# Patient Record
Sex: FEMALE | Race: WHITE | URBAN - METROPOLITAN AREA
[De-identification: names, ages, dates, MRNs, and addresses within clinical notes are randomized per-mention and may not be internally consistent; named-entity substitution may affect disease eponyms.]

---

## 2019-05-08 PROBLEM — 27503000 GILBERT'S SYNDROME: Status: ACTIVE | Noted: 2019-05-08

## 2019-05-08 PROBLEM — 35489007 DEPRESSION: Status: ACTIVE | Noted: 2019-05-08

## 2019-05-08 PROBLEM — 197480006 ANXIETY DISORDER: Status: ACTIVE | Noted: 2019-05-08

## 2021-08-28 ENCOUNTER — TELEPHONE ENCOUNTER (OUTPATIENT)
Dept: URBAN - METROPOLITAN AREA CLINIC 13 | Facility: CLINIC | Age: 46
End: 2021-08-28

## 2021-08-29 ENCOUNTER — TELEPHONE ENCOUNTER (OUTPATIENT)
Dept: URBAN - METROPOLITAN AREA CLINIC 13 | Facility: CLINIC | Age: 46
End: 2021-08-29

## 2021-08-29 RX ORDER — LEVOTHYROXINE SODIUM 0.14 MG/1
TABLET ORAL
Status: ACTIVE | COMMUNITY

## 2021-08-29 RX ORDER — MULTIVIT-MIN/FOLIC/VIT K/LYCOP 400-300MCG
TABLET ORAL
Status: ACTIVE | COMMUNITY

## 2021-08-29 RX ORDER — FLUOXETINE HYDROCHLORIDE 20 MG/1
CAPSULE ORAL
Status: ACTIVE | COMMUNITY

## 2021-08-29 RX ORDER — LEVOTHYROXINE SODIUM 125 UG/1
TABLET ORAL
Status: ACTIVE | COMMUNITY

## 2023-05-01 ENCOUNTER — P2P PATIENT RECORD (OUTPATIENT)
Age: 48
End: 2023-05-01

## 2023-05-22 ENCOUNTER — OFFICE VISIT (OUTPATIENT)
Dept: URBAN - METROPOLITAN AREA CLINIC 70 | Facility: CLINIC | Age: 48
End: 2023-05-22

## 2023-10-26 ENCOUNTER — LAB OUTSIDE AN ENCOUNTER (OUTPATIENT)
Dept: URBAN - METROPOLITAN AREA CLINIC 70 | Facility: CLINIC | Age: 48
End: 2023-10-26

## 2023-10-26 ENCOUNTER — DASHBOARD ENCOUNTERS (OUTPATIENT)
Age: 48
End: 2023-10-26

## 2023-10-26 ENCOUNTER — OFFICE VISIT (OUTPATIENT)
Dept: URBAN - METROPOLITAN AREA CLINIC 70 | Facility: CLINIC | Age: 48
End: 2023-10-26
Payer: COMMERCIAL

## 2023-10-26 VITALS
DIASTOLIC BLOOD PRESSURE: 74 MMHG | SYSTOLIC BLOOD PRESSURE: 134 MMHG | TEMPERATURE: 97.7 F | HEIGHT: 61 IN | BODY MASS INDEX: 23.94 KG/M2 | WEIGHT: 126.8 LBS | HEART RATE: 91 BPM

## 2023-10-26 DIAGNOSIS — R10.13 EPIGASTRIC PAIN: ICD-10-CM

## 2023-10-26 DIAGNOSIS — Z12.11 COLON CANCER SCREENING: ICD-10-CM

## 2023-10-26 DIAGNOSIS — R79.89 ELEVATED LFTS: ICD-10-CM

## 2023-10-26 DIAGNOSIS — K52.89 OTHER SPECIFIED NONINFECTIVE GASTROENTERITIS AND COLITIS: ICD-10-CM

## 2023-10-26 PROCEDURE — 99204 OFFICE O/P NEW MOD 45 MIN: CPT | Performed by: NURSE PRACTITIONER

## 2023-10-26 RX ORDER — TRAZODONE HYDROCHLORIDE 150 MG/1
TABLET ORAL
Qty: 90 TABLET | Status: ACTIVE | COMMUNITY

## 2023-10-26 RX ORDER — MULTIVIT-MIN/FOLIC/VIT K/LYCOP 400-300MCG
TABLET ORAL
Status: ON HOLD | COMMUNITY

## 2023-10-26 RX ORDER — OMEPRAZOLE 40 MG/1
1 CAPSULE 30 MINUTES BEFORE MORNING MEAL CAPSULE, DELAYED RELEASE ORAL ONCE A DAY
Qty: 90 | Refills: 3 | OUTPATIENT
Start: 2023-10-26

## 2023-10-26 RX ORDER — FLUOXETINE HYDROCHLORIDE 20 MG/1
CAPSULE ORAL
Status: ON HOLD | COMMUNITY

## 2023-10-26 RX ORDER — LEVOTHYROXINE SODIUM 0.14 MG/1
TABLET ORAL
Status: ON HOLD | COMMUNITY

## 2023-10-26 RX ORDER — TEMAZEPAM 15 MG/1
CAPSULE ORAL
Qty: 60 CAPSULE | Status: ACTIVE | COMMUNITY

## 2023-10-26 RX ORDER — LEVOTHYROXINE SODIUM 175 UG/1
TABLET ORAL
Qty: 45 TABLET | Status: ACTIVE | COMMUNITY

## 2023-10-26 RX ORDER — CYCLOBENZAPRINE HYDROCHLORIDE 10 MG/1
TABLET, FILM COATED ORAL
Qty: 90 TABLET | Status: ACTIVE | COMMUNITY

## 2023-10-26 RX ORDER — ESTRADIOL 0.1 MG/G
INSERT 1 APPLICATORFUL VAGINALLY ONCE A DAY FOR 2 WEEKS, THEN 2 - 3 TIMES PER WEEK AFTERWARDS CREAM VAGINAL
Qty: 42.5 GRAM | Refills: 0 | Status: ACTIVE | COMMUNITY

## 2023-10-26 RX ORDER — LEVOTHYROXINE SODIUM 150 UG/1
TABLET ORAL
Qty: 45 TABLET | Status: ACTIVE | COMMUNITY

## 2023-10-26 RX ORDER — DOXEPIN HYDROCHLORIDE 75 MG/1
CAPSULE ORAL
Qty: 90 CAPSULE | Status: ACTIVE | COMMUNITY

## 2023-10-26 RX ORDER — ESTERIFIED ESTROGENS AND METHYLTESTOSTERONE .625; 1.25 MG/1; MG/1
TABLET ORAL
Qty: 30 TABLET | Status: ACTIVE | COMMUNITY

## 2023-10-26 RX ORDER — LEVOTHYROXINE SODIUM 125 UG/1
TABLET ORAL
Status: ON HOLD | COMMUNITY

## 2023-10-26 NOTE — HPI-TODAY'S VISIT:
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 Santa; currently [place] [facility]) who presents today for abdominal pain. She reports chronic intermittent epigastric pain. Pain is worse after eating and was not improved with taking PPI. No NSAIDs. No prior EGD. With a prior episode of epigastric pain she underwent abdominal u/s and unltimately ended up with having cholecystectomy with symptoms now recurrent. Underwent  abdominal CT 9/27/23 for pain that showed enteritis. She was given flagyl with symptoms again improving but then reoccurring. Most recent labs 10/4/23 showed elevated LFTs with alk phos 150 and ALT 59 but WBC, plt, and H/H WNL. No hx of liver disease. She believes she underwent colonoscopy in 2019 with tx of abscess but is unsure of results. No known Fhx of colon cancer or IBD. Denies wt loss, vomiting, dysphagia, jaundice, constipation, diarrhea, or signs of GI bleeding.

## 2023-11-02 LAB
A/G RATIO: 2
ALBUMIN: 4.6
ALKALINE PHOSPHATASE: 67
ALT (SGPT): 22
AST (SGOT): 27
BILIRUBIN, TOTAL: 1.2
BUN/CREATININE RATIO: (no result)
BUN: 14
C-REACTIVE PROTEIN, QUANT: 1
CALCIUM: 9.6
CARBON DIOXIDE, TOTAL: 26
CHLORIDE: 108
CREATININE: 0.93
EGFR: 75
GLOBULIN, TOTAL: 2.3
GLUCOSE: 85
POTASSIUM: 4.5
PROTEIN, TOTAL: 6.9
SED RATE BY MODIFIED: 2
SODIUM: 142

## 2023-11-06 ENCOUNTER — WEB ENCOUNTER (OUTPATIENT)
Dept: URBAN - METROPOLITAN AREA CLINIC 70 | Facility: CLINIC | Age: 48
End: 2023-11-06

## 2023-11-07 ENCOUNTER — WEB ENCOUNTER (OUTPATIENT)
Dept: URBAN - METROPOLITAN AREA CLINIC 70 | Facility: CLINIC | Age: 48
End: 2023-11-07

## 2023-11-07 LAB — CALPROTECTIN, FECAL: 31

## 2023-11-08 ENCOUNTER — WEB ENCOUNTER (OUTPATIENT)
Dept: URBAN - METROPOLITAN AREA CLINIC 70 | Facility: CLINIC | Age: 48
End: 2023-11-08

## 2023-11-15 ENCOUNTER — CLAIMS CREATED FROM THE CLAIM WINDOW (OUTPATIENT)
Dept: URBAN - METROPOLITAN AREA SURGERY CENTER 24 | Facility: SURGERY CENTER | Age: 48
End: 2023-11-15
Payer: COMMERCIAL

## 2023-11-15 ENCOUNTER — TELEPHONE ENCOUNTER (OUTPATIENT)
Dept: URBAN - METROPOLITAN AREA CLINIC 70 | Facility: CLINIC | Age: 48
End: 2023-11-15

## 2023-11-15 DIAGNOSIS — Z12.11 COLON CANCER SCREENING (HIGH RISK): ICD-10-CM

## 2023-11-15 DIAGNOSIS — R10.13 ABDOMINAL DISCOMFORT, EPIGASTRIC: ICD-10-CM

## 2023-11-15 DIAGNOSIS — Z12.11 COLON CANCER SCREENING: ICD-10-CM

## 2023-11-15 DIAGNOSIS — K57.30 DIVERTICULA, COLON: ICD-10-CM

## 2023-11-15 DIAGNOSIS — R10.13 EPIGASTRIC ABDOMINAL PAIN: ICD-10-CM

## 2023-11-15 PROCEDURE — 43235 EGD DIAGNOSTIC BRUSH WASH: CPT | Performed by: INTERNAL MEDICINE

## 2023-11-15 PROCEDURE — G0121 COLON CA SCRN NOT HI RSK IND: HCPCS | Performed by: INTERNAL MEDICINE

## 2023-11-15 PROCEDURE — G8907 PT DOC NO EVENTS ON DISCHARG: HCPCS | Performed by: INTERNAL MEDICINE

## 2023-11-15 PROCEDURE — 00813 ANES UPR LWR GI NDSC PX: CPT | Performed by: NURSE ANESTHETIST, CERTIFIED REGISTERED

## 2023-11-15 RX ORDER — TRAZODONE HYDROCHLORIDE 150 MG/1
TABLET ORAL
Qty: 90 TABLET | Status: ACTIVE | COMMUNITY

## 2023-11-15 RX ORDER — LEVOTHYROXINE SODIUM 125 UG/1
TABLET ORAL
Status: ON HOLD | COMMUNITY

## 2023-11-15 RX ORDER — DOXEPIN HYDROCHLORIDE 75 MG/1
CAPSULE ORAL
Qty: 90 CAPSULE | Status: ACTIVE | COMMUNITY

## 2023-11-15 RX ORDER — MULTIVIT-MIN/FOLIC/VIT K/LYCOP 400-300MCG
TABLET ORAL
Status: ON HOLD | COMMUNITY

## 2023-11-15 RX ORDER — ESTRADIOL 0.1 MG/G
INSERT 1 APPLICATORFUL VAGINALLY ONCE A DAY FOR 2 WEEKS, THEN 2 - 3 TIMES PER WEEK AFTERWARDS CREAM VAGINAL
Qty: 42.5 GRAM | Refills: 0 | Status: ACTIVE | COMMUNITY

## 2023-11-15 RX ORDER — LEVOTHYROXINE SODIUM 0.14 MG/1
TABLET ORAL
Status: ON HOLD | COMMUNITY

## 2023-11-15 RX ORDER — TEMAZEPAM 15 MG/1
CAPSULE ORAL
Qty: 60 CAPSULE | Status: ACTIVE | COMMUNITY

## 2023-11-15 RX ORDER — LEVOTHYROXINE SODIUM 150 UG/1
TABLET ORAL
Qty: 45 TABLET | Status: ACTIVE | COMMUNITY

## 2023-11-15 RX ORDER — ESTERIFIED ESTROGENS AND METHYLTESTOSTERONE .625; 1.25 MG/1; MG/1
TABLET ORAL
Qty: 30 TABLET | Status: ACTIVE | COMMUNITY

## 2023-11-15 RX ORDER — CYCLOBENZAPRINE HYDROCHLORIDE 10 MG/1
TABLET, FILM COATED ORAL
Qty: 90 TABLET | Status: ACTIVE | COMMUNITY

## 2023-11-15 RX ORDER — LEVOTHYROXINE SODIUM 175 UG/1
TABLET ORAL
Qty: 45 TABLET | Status: ACTIVE | COMMUNITY

## 2023-11-15 RX ORDER — OMEPRAZOLE 40 MG/1
1 CAPSULE 30 MINUTES BEFORE MORNING MEAL CAPSULE, DELAYED RELEASE ORAL ONCE A DAY
Qty: 90 | Refills: 3 | Status: ACTIVE | COMMUNITY
Start: 2023-10-26

## 2023-11-15 RX ORDER — DICYCLOMINE HYDROCHLORIDE 10 MG/1
1 TABLET CAPSULE ORAL THREE TIMES A DAY
Qty: 90 | Refills: 6 | OUTPATIENT
Start: 2023-11-15 | End: 2024-06-12

## 2023-11-15 RX ORDER — FLUOXETINE HYDROCHLORIDE 20 MG/1
CAPSULE ORAL
Status: ON HOLD | COMMUNITY

## 2024-09-18 ENCOUNTER — LAB OUTSIDE AN ENCOUNTER (OUTPATIENT)
Dept: URBAN - METROPOLITAN AREA CLINIC 70 | Facility: CLINIC | Age: 49
End: 2024-09-18

## 2024-09-18 ENCOUNTER — OFFICE VISIT (OUTPATIENT)
Dept: URBAN - METROPOLITAN AREA CLINIC 70 | Facility: CLINIC | Age: 49
End: 2024-09-18
Payer: COMMERCIAL

## 2024-09-18 VITALS
DIASTOLIC BLOOD PRESSURE: 80 MMHG | HEIGHT: 61 IN | TEMPERATURE: 97.9 F | BODY MASS INDEX: 24.73 KG/M2 | HEART RATE: 96 BPM | WEIGHT: 131 LBS | SYSTOLIC BLOOD PRESSURE: 120 MMHG

## 2024-09-18 DIAGNOSIS — R10.13 EPIGASTRIC PAIN: ICD-10-CM

## 2024-09-18 DIAGNOSIS — K52.89 (LYMPHOCYTIC) MICROSCOPIC COLITIS: ICD-10-CM

## 2024-09-18 DIAGNOSIS — Z87.19 HISTORY OF RECTAL ABSCESS: ICD-10-CM

## 2024-09-18 DIAGNOSIS — Z12.11 COLON CANCER SCREENING: ICD-10-CM

## 2024-09-18 PROCEDURE — 99214 OFFICE O/P EST MOD 30 MIN: CPT | Performed by: NURSE PRACTITIONER

## 2024-09-18 RX ORDER — DOXEPIN HYDROCHLORIDE 75 MG/1
CAPSULE ORAL
Qty: 90 CAPSULE | Status: ACTIVE | COMMUNITY

## 2024-09-18 RX ORDER — LEVOTHYROXINE SODIUM 0.14 MG/1
TABLET ORAL
Status: ACTIVE | COMMUNITY

## 2024-09-18 RX ORDER — DILTIAZEM HYDROCHLORIDE 120 MG/1
CAPSULE, EXTENDED RELEASE ORAL
Qty: 30 CAPSULE | Status: ACTIVE | COMMUNITY

## 2024-09-18 RX ORDER — LEVOTHYROXINE SODIUM 150 UG/1
TABLET ORAL
Qty: 45 TABLET | Status: DISCONTINUED | COMMUNITY

## 2024-09-18 RX ORDER — ESTERIFIED ESTROGENS AND METHYLTESTOSTERONE .625; 1.25 MG/1; MG/1
TABLET ORAL
Qty: 30 TABLET | Status: ACTIVE | COMMUNITY

## 2024-09-18 RX ORDER — CYCLOBENZAPRINE HYDROCHLORIDE 10 MG/1
TABLET, FILM COATED ORAL
Qty: 90 TABLET | Status: DISCONTINUED | COMMUNITY

## 2024-09-18 RX ORDER — MULTIVIT-MIN/FOLIC/VIT K/LYCOP 400-300MCG
TABLET ORAL
Status: DISCONTINUED | COMMUNITY

## 2024-09-18 RX ORDER — TEMAZEPAM 15 MG/1
CAPSULE ORAL
Qty: 60 CAPSULE | Status: ACTIVE | COMMUNITY

## 2024-09-18 RX ORDER — OMEPRAZOLE 40 MG/1
1 CAPSULE 30 MINUTES BEFORE MORNING MEAL CAPSULE, DELAYED RELEASE ORAL ONCE A DAY
Qty: 90 | Refills: 3 | Status: ACTIVE | COMMUNITY
Start: 2023-10-26

## 2024-09-18 RX ORDER — LEVOTHYROXINE SODIUM 175 UG/1
TABLET ORAL
Qty: 45 TABLET | Status: DISCONTINUED | COMMUNITY

## 2024-09-18 RX ORDER — LEVOTHYROXINE SODIUM 125 UG/1
TABLET ORAL
Status: ACTIVE | COMMUNITY

## 2024-09-18 RX ORDER — FLUOXETINE HYDROCHLORIDE 20 MG/1
CAPSULE ORAL
Status: DISCONTINUED | COMMUNITY

## 2024-09-18 RX ORDER — ESTRADIOL 0.1 MG/G
INSERT 1 APPLICATORFUL VAGINALLY ONCE A DAY FOR 2 WEEKS, THEN 2 - 3 TIMES PER WEEK AFTERWARDS CREAM VAGINAL
Qty: 42.5 GRAM | Refills: 0 | COMMUNITY

## 2024-09-18 RX ORDER — TRAZODONE HYDROCHLORIDE 150 MG/1
TABLET ORAL
Qty: 90 TABLET | Status: ACTIVE | COMMUNITY

## 2024-09-18 RX ORDER — SUCRALFATE 1 G/1
1 TABLET ON AN EMPTY STOMACH TABLET ORAL TWICE A DAY
Status: ACTIVE | COMMUNITY

## 2024-09-18 NOTE — HPI-TODAY'S VISIT:
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 Santa; currently followed by [facility] whitman) who presents today for abdominal pain. She reports chronic intermittent epigastric pain. Pain is worse after eating and was not improved with taking PPI. No NSAIDs. No prior EGD. With a prior episode of epigastric pain she underwent abdominal u/s and unltimately ended up with having cholecystectomy with symptoms now recurrent. Underwent  abdominal CT 9/27/23 for pain that showed enteritis. She was given flagyl with symptoms again improving but then reoccurring. Most recent labs 10/4/23 showed elevated LFTs with alk phos 150 and ALT 59 but WBC, plt, and H/H WNL. No hx of liver disease. She believes she underwent colonoscopy in 2019 with tx of abscess but is unsure of results. No known Fhx of colon cancer or IBD. Denies wt loss, vomiting, dysphagia, jaundice, constipation, diarrhea, or signs of GI bleeding. - - - - - - - - - Today 9/18/24: Patient presents today for follow up/abd pain. Underwent EGD/colonoscopy 11/15/23 with findings of normal EGD and diverticulosis in colon (TI was not seen). Last labs 11/1/23 showed inflammatory markers normal and LFTs back to normal. She reports continued intermittent epigastric pain w/o change that is random and not postprandial. No new GI complaints.

## 2024-10-22 ENCOUNTER — OFFICE VISIT (OUTPATIENT)
Dept: URBAN - METROPOLITAN AREA CLINIC 70 | Facility: CLINIC | Age: 49
End: 2024-10-22
Payer: COMMERCIAL

## 2024-10-22 VITALS
TEMPERATURE: 98.1 F | HEIGHT: 61 IN | WEIGHT: 137.2 LBS | SYSTOLIC BLOOD PRESSURE: 113 MMHG | HEART RATE: 85 BPM | DIASTOLIC BLOOD PRESSURE: 70 MMHG | BODY MASS INDEX: 25.9 KG/M2

## 2024-10-22 DIAGNOSIS — Z12.11 COLON CANCER SCREENING: ICD-10-CM

## 2024-10-22 DIAGNOSIS — K52.89 OTHER AND UNSPECIFIED NONINFECTIOUS GASTROENTERITIS AND COLITIS: ICD-10-CM

## 2024-10-22 DIAGNOSIS — R79.89 ELEVATED LFTS: ICD-10-CM

## 2024-10-22 DIAGNOSIS — R10.13 EPIGASTRIC PAIN: ICD-10-CM

## 2024-10-22 DIAGNOSIS — Z87.19 HISTORY OF RECTAL ABSCESS: ICD-10-CM

## 2024-10-22 PROCEDURE — 99214 OFFICE O/P EST MOD 30 MIN: CPT | Performed by: NURSE PRACTITIONER

## 2024-10-22 RX ORDER — SUCRALFATE 1 G/1
1 TABLET ON AN EMPTY STOMACH TABLET ORAL TWICE A DAY
Status: ACTIVE | COMMUNITY

## 2024-10-22 RX ORDER — OMEPRAZOLE 40 MG/1
1 CAPSULE 30 MINUTES BEFORE MORNING MEAL CAPSULE, DELAYED RELEASE ORAL ONCE A DAY
Qty: 90 | Refills: 3 | Status: ACTIVE | COMMUNITY
Start: 2023-10-26

## 2024-10-22 RX ORDER — TRAZODONE HYDROCHLORIDE 150 MG/1
TABLET ORAL
Qty: 90 TABLET | Status: ACTIVE | COMMUNITY

## 2024-10-22 RX ORDER — TRAZODONE HYDROCHLORIDE 150 MG/1
TABLET ORAL
OUTPATIENT

## 2024-10-22 RX ORDER — TEMAZEPAM 15 MG/1
CAPSULE ORAL
Qty: 60 CAPSULE | Status: ACTIVE | COMMUNITY

## 2024-10-22 RX ORDER — DILTIAZEM HYDROCHLORIDE 120 MG/1
CAPSULE, EXTENDED RELEASE ORAL
Qty: 30 CAPSULE | Status: DISCONTINUED | COMMUNITY

## 2024-10-22 RX ORDER — DICYCLOMINE HYDROCHLORIDE 10 MG/1
2 CAPSULES CAPSULE ORAL THREE TIMES A DAY
Qty: 180 | Status: ACTIVE | COMMUNITY
Start: 2024-10-22 | End: 2024-11-20

## 2024-10-22 RX ORDER — DOXEPIN HYDROCHLORIDE 75 MG/1
CAPSULE ORAL
Qty: 90 CAPSULE | Status: ACTIVE | COMMUNITY

## 2024-10-22 RX ORDER — ESTERIFIED ESTROGENS AND METHYLTESTOSTERONE .625; 1.25 MG/1; MG/1
TABLET ORAL
Qty: 30 TABLET | Status: DISCONTINUED | COMMUNITY

## 2024-10-22 RX ORDER — NORTRIPTYLINE HYDROCHLORIDE 25 MG/1
1 CAPSULE AT BEDTIME CAPSULE ORAL ONCE A DAY
Qty: 90 CAPSULE | Refills: 3 | OUTPATIENT
Start: 2024-10-22

## 2024-10-22 RX ORDER — ESTRADIOL 0.1 MG/G
INSERT 1 APPLICATORFUL VAGINALLY ONCE A DAY FOR 2 WEEKS, THEN 2 - 3 TIMES PER WEEK AFTERWARDS CREAM VAGINAL
Qty: 42.5 GRAM | Refills: 0 | COMMUNITY

## 2024-10-22 RX ORDER — LEVOTHYROXINE SODIUM 125 UG/1
TABLET ORAL
Status: ACTIVE | COMMUNITY

## 2024-10-22 RX ORDER — LEVOTHYROXINE SODIUM 0.14 MG/1
TABLET ORAL
Status: ACTIVE | COMMUNITY

## 2024-10-22 NOTE — HPI-TODAY'S VISIT:
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 Santa; [place]) who presents today for abdominal pain. She reports chronic intermittent epigastric pain. Pain is worse after eating and was not improved with taking PPI. No NSAIDs. No prior EGD. With a prior episode of epigastric pain she underwent abdominal u/s and unltimately ended up with having cholecystectomy with symptoms now recurrent. Underwent  abdominal CT 9/27/23 for pain that showed enteritis. She was given flagyl with symptoms again improving but then reoccurring. Most recent labs 10/4/23 showed elevated LFTs with alk phos 150 and ALT 59 but WBC, plt, and H/H WNL. No hx of liver disease. She believes she underwent colonoscopy in 2019 with tx of abscess but is unsure of results. No known Fhx of colon cancer or IBD. Denies wt loss, vomiting, dysphagia, jaundice, constipation, diarrhea, or signs of GI bleeding. - - - - - - - - - OV 9/18/24: Patient presents today for follow up/abd pain. Underwent EGD/colonoscopy 11/15/23 with findings of normal EGD and diverticulosis in colon (TI was not seen). Last labs 11/1/23 showed inflammatory markers normal and LFTs back to normal. She reports continued intermittent epigastric pain w/o change that is random and not postprandial. No new GI complaints. - - - - - - - - - Today 10/22/24: Patient presents today for follow up. IBD panel 9/21/24 was negative. CTE 10/2/24 showed no acute process and no evidence of small bowel inflammation (IBD). Results discussed with patient. Results discussed with patient. She reports continued intermittent epigastric pain but pain is not as intense and is improved with dicyclomine PRN. No new GI compaints.

## 2024-10-25 ENCOUNTER — WEB ENCOUNTER (OUTPATIENT)
Dept: URBAN - METROPOLITAN AREA CLINIC 70 | Facility: CLINIC | Age: 49
End: 2024-10-25

## 2024-10-25 RX ORDER — NORTRIPTYLINE HYDROCHLORIDE 25 MG/1
1 CAPSULE AT BEDTIME CAPSULE ORAL ONCE A DAY
Qty: 90 CAPSULE | Refills: 3
Start: 2024-10-22

## 2024-10-31 ENCOUNTER — TELEPHONE ENCOUNTER (OUTPATIENT)
Dept: URBAN - METROPOLITAN AREA CLINIC 70 | Facility: CLINIC | Age: 49
End: 2024-10-31

## 2024-10-31 RX ORDER — NORTRIPTYLINE HYDROCHLORIDE 25 MG/1
1 CAPSULE AT BEDTIME CAPSULE ORAL ONCE A DAY
Qty: 90 CAPSULE | Refills: 3
Start: 2024-10-22

## 2024-12-14 ENCOUNTER — P2P PATIENT RECORD (OUTPATIENT)
Age: 49
End: 2024-12-14

## 2024-12-18 ENCOUNTER — OFFICE VISIT (OUTPATIENT)
Dept: URBAN - METROPOLITAN AREA CLINIC 70 | Facility: CLINIC | Age: 49
End: 2024-12-18

## 2025-02-21 ENCOUNTER — TELEPHONE ENCOUNTER (OUTPATIENT)
Dept: URBAN - METROPOLITAN AREA CLINIC 70 | Facility: CLINIC | Age: 50
End: 2025-02-21